# Patient Record
Sex: FEMALE | Race: BLACK OR AFRICAN AMERICAN | NOT HISPANIC OR LATINO | Employment: FULL TIME | URBAN - METROPOLITAN AREA
[De-identification: names, ages, dates, MRNs, and addresses within clinical notes are randomized per-mention and may not be internally consistent; named-entity substitution may affect disease eponyms.]

---

## 2019-07-08 ENCOUNTER — HOSPITAL ENCOUNTER (EMERGENCY)
Facility: HOSPITAL | Age: 64
Discharge: HOME | End: 2019-07-08
Attending: EMERGENCY MEDICINE

## 2019-07-08 VITALS
SYSTOLIC BLOOD PRESSURE: 175 MMHG | HEIGHT: 67 IN | RESPIRATION RATE: 20 BRPM | HEART RATE: 89 BPM | DIASTOLIC BLOOD PRESSURE: 93 MMHG | TEMPERATURE: 98 F | WEIGHT: 290 LBS | BODY MASS INDEX: 45.52 KG/M2 | OXYGEN SATURATION: 98 %

## 2019-07-08 DIAGNOSIS — L74.0 HEAT RASH: Primary | ICD-10-CM

## 2019-07-08 DIAGNOSIS — B37.2 YEAST INFECTION OF THE SKIN: ICD-10-CM

## 2019-07-08 LAB — POCT GLUCOSE: 108 MG/DL (ref 70–110)

## 2019-07-08 PROCEDURE — 99283 EMERGENCY DEPT VISIT LOW MDM: CPT

## 2019-07-08 PROCEDURE — 82962 GLUCOSE BLOOD TEST: CPT

## 2019-07-08 RX ORDER — FLUCONAZOLE 200 MG/1
200 TABLET ORAL DAILY
Qty: 7 TABLET | Refills: 0 | Status: SHIPPED | OUTPATIENT
Start: 2019-07-08 | End: 2019-07-15

## 2019-07-08 NOTE — ED NOTES
Patient presents to ED with c/o Rash. Patient states rash covers her entire pelvic area and goes up to her buttocks. Patient states she has had the rash for about three weeks and has taken OTC creams and nothing seems to be working.        APPEARANCE: Alert, oriented and in no acute distress.  CARDIAC: Normal rate and rhythm, no murmur heard.   PERIPHERAL VASCULAR: peripheral pulses present. Normal cap refill. No edema. Warm to touch.    RESPIRATORY:Normal rate and effort, breath sounds clear bilaterally throughout chest. Respirations are equal and unlabored no obvious signs of distress. SOB WHEN WALKING FROM LOBBY TO ROOM.  GASTRO: soft, bowel sounds normal, no tenderness, OBESE, abdominal distention.  MUSC: Full ROM. No bony tenderness or soft tissue tenderness. No obvious deformity.  SKIN: Skin is warm and dry, normal skin turgor, mucous membranes moist. RASH TO PELVIC AND BUTTOCKS  NEURO: 5/5 strength major flexors/extensors bilaterally. Sensory intact to light touch bilaterally. Marck coma scale: eyes open spontaneously-4, oriented & converses-5, obeys commands-6. No neurological abnormalities.   MENTAL STATUS: awake, alert and aware of environment.  EYE: PERRL, both eyes: pupils brisk and reactive to light. Normal size.  ENT: EARS: no obvious drainage. NOSE: no active bleeding.   GENITALIA: Normal external genitalia.

## 2019-07-08 NOTE — ED PROVIDER NOTES
Encounter Date: 2019       History     Chief Complaint   Patient presents with    Female  Problem     64 year old female presents to ed cc of rash to pelvic area x 3 weeks patient states rash has gotten over past few days.      64-year-old female with asthma and hypertension which presents the emergency room with a rash to her groin for the past 3 weeks patient states that she stays very moist in her groin area which is causing the rash to get worse and is spreading.  She is only applied Neosporin to the area.  She has not followed up with her primary care for this problem. Patient states that she also had a cyst that burst a couple of days ago but it is much better now.  Patient denies fever or chills. Patient denies any other symptoms.    The history is provided by the patient.     Review of patient's allergies indicates:  No Known Allergies  Past Medical History:   Diagnosis Date    Asthma     HTN (hypertension)      Past Surgical History:   Procedure Laterality Date     SECTION       History reviewed. No pertinent family history.  Social History     Tobacco Use    Smoking status: Never Smoker   Substance Use Topics    Alcohol use: No    Drug use: No     Review of Systems   Constitutional: Negative for chills and fever.   HENT: Negative for sore throat.    Respiratory: Negative for shortness of breath.    Cardiovascular: Negative for chest pain.   Gastrointestinal: Negative for nausea.   Endocrine: Negative for polydipsia, polyphagia and polyuria.   Genitourinary: Negative for dysuria.   Musculoskeletal: Negative for back pain.   Skin: Positive for color change and rash.   Neurological: Negative for weakness.   Hematological: Does not bruise/bleed easily.   All other systems reviewed and are negative.    Physical Exam     Initial Vitals   BP Pulse Resp Temp SpO2   19 0730 19 0728 19 0728 19 0728 19 0728   (!) 197/97 90 20 97.8 °F (36.6 °C) 99 %      MAP       --                 Physical Exam    Nursing note and vitals reviewed.  Constitutional: She appears well-developed and well-nourished. She is Obese . She is cooperative.   HENT:   Head: Normocephalic and atraumatic.   Eyes: Conjunctivae and EOM are normal. Pupils are equal, round, and reactive to light.   Cardiovascular: Normal rate, regular rhythm, normal heart sounds and intact distal pulses. Exam reveals no gallop and no friction rub.    No murmur heard.  Pulmonary/Chest: Breath sounds normal. No respiratory distress. She has no wheezes. She has no rhonchi. She has no rales. She exhibits no tenderness.   Abdominal: Soft. Bowel sounds are normal.   Neurological: She is alert and oriented to person, place, and time. She has normal strength. GCS score is 15. GCS eye subscore is 4. GCS verbal subscore is 5. GCS motor subscore is 6.   Skin: Rash noted.   Perineum with erythema and minimal edema consistent with yeast versus heat rash; no foul-smelling odor or evidence of cellulitis; small healing boil without evidence cellulitis noted to left suprapubic region       ED Course   Procedures  Labs Reviewed   POCT GLUCOSE   POCT GLUCOSE MONITORING CONTINUOUS           Medical Decision Making:   Initial Assessment:   64-year-old female with asthma and hypertension which presents the emergency room with a rash to her groin for the past 3 weeks patient states that she stays very moist in her groin area which is causing the rash to get worse and is spreading.  She is only applied Neosporin to the area.  She has not followed up with her primary care for this problem. Patient states that she also had a cyst that burst a couple of days ago but it is much better now.  Patient denies fever or chills. Patient denies any other symptoms.  Differential Diagnosis:   Heat rash, yeast infection, abscess  Clinical Tests:   Lab Tests: Ordered and Reviewed       <> Summary of Lab: Glucose 108  ED Management:  Patient examined and noted to have a  heat rash versus a fungal rash. Patient given Diflucan for 1 week and also advised to put Desitin or bougie was but paste to the area to help alleviate the discomfort.  Patient also advised to wear dresses without any underwear to help the area air out.  Patient advised to follow up with her primary care as her sugar was 108 and she should have her sugar rechecked.  Patient given strict return precautions and voiced understanding of all discharge instructions.  Patient stable at discharge. Patient also advised to monitor the area where the abscess was as it is very hard to determine if she has any surrounding cellulitis secondary to the fungal rash. Patient voiced understanding of these instructions.                   ED Course as of Jul 08 0757   Mon Jul 08, 2019   0740 BP(!): 197/97 [AT]   0740 Temp: 97.8 °F (36.6 °C) [AT]   0740 Temp src: Oral [AT]   0740 Pulse: 90 [AT]   0740 Resp: 20 [AT]   0740 SpO2: 99 % [AT]      ED Course User Index  [AT] KEMAL Paul     Clinical Impression:       ICD-10-CM ICD-9-CM   1. Heat rash L74.0 705.1   2. Yeast infection of the skin B37.2 112.3                                KEMAL Paul  07/08/19 0753

## 2019-07-08 NOTE — DISCHARGE INSTRUCTIONS
Apply Desitin or Domi's butt paste twice a day to the affected area  Keep area dry to help with moisture build up  Return with any complications